# Patient Record
Sex: MALE | Race: WHITE | NOT HISPANIC OR LATINO | Employment: FULL TIME | URBAN - METROPOLITAN AREA
[De-identification: names, ages, dates, MRNs, and addresses within clinical notes are randomized per-mention and may not be internally consistent; named-entity substitution may affect disease eponyms.]

---

## 2020-07-01 ENCOUNTER — HOSPITAL ENCOUNTER (EMERGENCY)
Facility: HOSPITAL | Age: 25
Discharge: HOME | End: 2020-07-01
Attending: EMERGENCY MEDICINE
Payer: MEDICAID

## 2020-07-01 VITALS
OXYGEN SATURATION: 97 % | BODY MASS INDEX: 25.06 KG/M2 | TEMPERATURE: 99 F | HEIGHT: 72 IN | RESPIRATION RATE: 20 BRPM | SYSTOLIC BLOOD PRESSURE: 143 MMHG | HEART RATE: 52 BPM | WEIGHT: 185 LBS | DIASTOLIC BLOOD PRESSURE: 67 MMHG

## 2020-07-01 DIAGNOSIS — R07.9 CHEST PAIN: ICD-10-CM

## 2020-07-01 LAB — SARS-COV-2 RNA RESP QL NAA+PROBE: NOT DETECTED

## 2020-07-01 PROCEDURE — 99284 PR EMERGENCY DEPT VISIT,LEVEL IV: ICD-10-PCS | Mod: ,,, | Performed by: PHYSICIAN ASSISTANT

## 2020-07-01 PROCEDURE — 99284 EMERGENCY DEPT VISIT MOD MDM: CPT | Mod: ,,, | Performed by: PHYSICIAN ASSISTANT

## 2020-07-01 PROCEDURE — 99283 EMERGENCY DEPT VISIT LOW MDM: CPT | Mod: 25

## 2020-07-01 PROCEDURE — U0003 INFECTIOUS AGENT DETECTION BY NUCLEIC ACID (DNA OR RNA); SEVERE ACUTE RESPIRATORY SYNDROME CORONAVIRUS 2 (SARS-COV-2) (CORONAVIRUS DISEASE [COVID-19]), AMPLIFIED PROBE TECHNIQUE, MAKING USE OF HIGH THROUGHPUT TECHNOLOGIES AS DESCRIBED BY CMS-2020-01-R: HCPCS

## 2020-07-01 NOTE — Clinical Note
"Sreedhar"Lee Vincent was seen and treated in our emergency department on 7/1/2020.     COVID-19 is present in our communities across the state. There is limited testing for COVID at this time, so not all patients can be tested. In this situation, your employee meets the following criteria:    Sreedhar Vincent has met the criteria for COVID-19 testing based upon symptoms, travel, and/or potential exposure. The test has been completed and is pending results at this time. During this time the employee is not able to work and should be quarantined per the Centers for Disease Control timelines.     If you have any questions or concerns, or if I can be of further assistance, please do not hesitate to contact me.    Sincerely,             Mehdi Salazar RN"

## 2020-07-01 NOTE — Clinical Note
Sreedhar Vincnet was seen and treated in our emergency department on 7/1/2020.  He may return to work on 07/02/2020.       If you have any questions or concerns, please don't hesitate to call.      Holden Stock PA-C

## 2020-07-01 NOTE — Clinical Note
"Sreedhar"Lee Vincent was seen and treated in our emergency department on 7/1/2020.     COVID-19 is present in our communities across the state. There is limited testing for COVID at this time, so not all patients can be tested. In this situation, your employee meets the following criteria:    Sreedhar Vincent has met the criteria for COVID-19 testing based upon symptoms, travel, and/or potential exposure. The test has been completed and is pending results at this time. During this time the employee is not able to work and should be quarantined per the Centers for Disease Control timelines.     If you have any questions or concerns, or if I can be of further assistance, please do not hesitate to contact me.    Sincerely,              RN"

## 2020-07-01 NOTE — Clinical Note
Sreedhar Vincent was seen and treated in our emergency department on 7/1/2020.  He may return to work on 07/02/2020.       If you have any questions or concerns, please don't hesitate to call.      Mehdi Salazar RN

## 2020-07-02 NOTE — ED PROVIDER NOTES
Encounter Date: 7/1/2020       History     Chief Complaint   Patient presents with    Chest Pain     reports cp x 2 weeks. describes it as sharp stabbing intermittent pain. denies cp at present. Denies SOB. Hx of anxiety.      HPI   Sreedhar Vincent is a 24 y.o. male with medical history of anxiety, tobacco use presenting to the ED with the chief complaint of chest pain. Patient reports intermittent chest pain for 2 weeks. Locates the chest pain in various areas on both the left and right side. Denies association or exacerbation factors such as exertion, deep breathing, eating, position changes. 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 Denies having chest pain at the time of my exam. Reports having stress and anxiety attacks for the past 3 months since moving to [facility]. Reports wife and child who is sick live out of state and is working two jobs in order to afford them moving to [facility] to be with him. No family history of earlier cardiac death. No ETOH use. He denies fever, SOB, cough, abdominal pain, nausea, vomiting, urinary or bowel movement changes.     Review of patient's allergies indicates:  No Known Allergies  History reviewed. No pertinent past medical history.  Past Surgical History:   Procedure Laterality Date    NECK SURGERY       History reviewed. No pertinent family history.  Social History     Tobacco Use    Smoking status: Current Every Day Smoker     Packs/day: 0.50     Types: Cigarettes, Vaping with nicotine   Substance Use Topics    Alcohol use: Not Currently    Drug use: Yes     Types: Marijuana     Review of Systems   Constitutional: Negative for fever.   HENT: Negative for sore throat.    Respiratory: Negative for cough and shortness of breath.    Cardiovascular: Positive for chest pain.   Gastrointestinal: Negative for nausea.   Genitourinary: Negative for dysuria.    Musculoskeletal: Negative for back pain.   Skin: Negative for rash.   Neurological: Negative for weakness.   Hematological: Does not bruise/bleed easily.   Psychiatric/Behavioral: The patient is nervous/anxious.        Physical Exam     Initial Vitals [07/01/20 2000]   BP Pulse Resp Temp SpO2   (!) 143/67 (!) 52 20 99.4 °F (37.4 °C) 97 %      MAP       --         Physical Exam    Constitutional: He appears well-developed and well-nourished. He is not diaphoretic. No distress.   HENT:   Head: Normocephalic and atraumatic.   Mouth/Throat: Oropharynx is clear and moist. No oropharyngeal exudate.   Eyes: EOM are normal. Pupils are equal, round, and reactive to light.   Neck: Normal range of motion. Neck supple.   Cardiovascular: Regular rhythm and intact distal pulses. Bradycardia present.    Pulmonary/Chest: Breath sounds normal. No respiratory distress. He has no wheezes.   Speaking full sentences without difficulty  No reproducible chest pain on exam   Abdominal: Soft. Bowel sounds are normal. There is no abdominal tenderness. There is no guarding.   Negative Flowers's   Musculoskeletal: Normal range of motion. No tenderness or edema.      Comments: No midline spinal tenderness   Lymphadenopathy:     He has no cervical adenopathy.   Neurological: He is alert and oriented to person, place, and time. He has normal strength. No cranial nerve deficit.   Skin: Skin is warm and dry. No erythema.       ED Course   Procedures  Labs Reviewed   SARS-COV-2 (COVID-19) QUALITATIVE PCR    Narrative:     Is the patient symptomatic?->Yes  Is this needed for pre-procedure or pre-op testing?->No          Imaging Results          X-Ray Chest AP Portable (Final result)  Result time 07/01/20 22:36:25    Final result by Bacilio Martinez MD (07/01/20 22:36:25)                 Impression:      No acute findings in the chest.      Electronically signed by: Bacilio Martinez MD  Date:    07/01/2020  Time:    22:36             Narrative:     EXAMINATION:  XR CHEST AP PORTABLE    CLINICAL HISTORY:  Chest pain, unspecified    TECHNIQUE:  Single frontal view of the chest was performed.    COMPARISON:  January 10, 2019.    FINDINGS:  No consolidation, pleural effusion or pneumothorax.    Cardiomediastinal silhouette is unremarkable.                                 Medical Decision Making:   History:   Old Medical Records: I decided to obtain old medical records.  Old Records Summarized: records from clinic visits.  Independently Interpreted Test(s):   I have ordered and independently interpreted EKG Reading(s) - see summary below       <> Summary of EKG Reading(s): Sinus bradycardia 51 bpm. No STEMI  Clinical Tests:   Radiological Study: Ordered and Reviewed  Medical Tests: Ordered and Reviewed       APC / Resident Notes:   24 y.o. male with medical history of anxiety, tobacco use presenting to the ED c/o 2 weeks of intermittent chest pain. Reports feeling stressed and anxious as he recently moved to [facility] and trying to save up money to move his  and  child to be with him. No SI/HI/Hallucinations. DDx includes but not limited to anxiety, social stressors, cardiac arrhythmia, musculoskeletal injury, pneumonia, COVID-19. I have considered but do not suspect ACS or aortic dissection.     CXR without acute findings. ECG without ischemia or concerning arrhythmia. Vitals unremarkable. Suspect social stressors and anxiety playing a role in his symptoms today. Advised outpatient follow-up with PCP and resources provided for him to obtain an appointment. COVID-19 pending and will follow-up results with patient. Patient expresses understanding and agreeable to the plan. Return to ED precautions given for new, worsening, or concerning symptoms.                              Clinical Impression:       ICD-10-CM ICD-9-CM   1. Chest pain  R07.9 786.50         Disposition:   Disposition: Discharged  Condition: Stable     ED Disposition Condition    Discharge  Stable        ED Prescriptions     None        Follow-up Information     Follow up With Specialties Details Why Contact Info Additional Information    [facility] - Internal Medicine Internal Medicine   [address]  [facility] [address] [phone] [facility] - Family Medicine Family Medicine   [address] [place] [id number]  [phone]       [place] [id number] [facility] [id number]     [facility]    [address] [facility] [address]  [phone]                                        Holden Stock PA-C  07/02/20 0002

## 2020-07-02 NOTE — ED NOTES
Sreedhar Vincent, a 24 y.o. male presents to the ED w/ complaint of sharp stabbing diffuse CP, at one time it localized to midsternum this evening when he got to work. Pain worse with ambulation and exertion and deep breaths. Pt reports HA. Denies SOB, fevers, chills, sweats, NVD, urinary symptoms. Hx of anxiety.     Triage note:  Chief Complaint   Patient presents with    Chest Pain     reports cp x 2 weeks. describes it as sharp stabbing intermittent pain. denies cp at present. Denies SOB. Hx of anxiety.      Review of patient's allergies indicates:  No Known Allergies  History reviewed. No pertinent past medical history.    Adult Physical Assessment  LOC: Sreedhar Vincent, 24 y.o. male verified via two identifiers.  The patient is awake, alert, oriented and speaking appropriately at this time.  APPEARANCE: Patient resting comfortably and appears to be in no acute distress at this time. Patient is clean and well groomed, patient's clothing is properly fastened.  SKIN:The skin is warm and dry, color consistent with ethnicity, patient has normal skin turgor and moist mucus membranes, skin intact, no breakdown or brusing noted.  MUSCULOSKELETAL: Patient moving all extremities well, no obvious swelling or deformities noted.  RESPIRATORY: Airway is open and patent, respirations are spontaneous, patient has a normal effort and rate, no accessory muscle use noted.  CARDIAC: Patient has a normal rate and rhythm, no periphreal edema noted in any extremity, capillary refill < 3 seconds in all extremities. Reported stabbing midsternal chest pain  ABDOMEN: Soft and non tender to palpation, no abdominal distention noted. Bowel sounds present in all four quadrants.  NEUROLOGIC: Eyes open spontaneously, behavior appropriate to situation, follows commands, facial expression symmetrical, bilateral hand grasp equal and even, purposeful motor response noted, normal sensation in all extremities when touched with a  finger.

## 2020-07-02 NOTE — PROVIDER PROGRESS NOTES - EMERGENCY DEPT.
Encounter Date: 7/1/2020    ED Physician Progress Notes         EKG - STEMI Decision  Initial Reading: No STEMI present.

## 2020-07-02 NOTE — DISCHARGE INSTRUCTIONS
Your chest x-ray was clear. No signs of pneumonia. Your COVID-19 test is in process and you will receive a phone call from our ED in the next 1-3 days with your results.    Follow-up with a primary care provider for further evaluation of your chest pain, anxiety, and stress. Use the below resources to obtain an appointment.    Return to the emergency room for new, worsening, or concerning symptoms.

## 2021-03-02 PROBLEM — L98.9 SCALP LESION: Status: ACTIVE | Noted: 2021-03-02

## 2021-03-02 PROBLEM — L72.0 EPIDERMOID CYST: Status: ACTIVE | Noted: 2021-03-02
